# Patient Record
Sex: FEMALE | Race: WHITE | Employment: OTHER | URBAN - METROPOLITAN AREA
[De-identification: names, ages, dates, MRNs, and addresses within clinical notes are randomized per-mention and may not be internally consistent; named-entity substitution may affect disease eponyms.]

---

## 2017-06-01 LAB
COLONOSCOPY, EXTERNAL: 0
COLONOSCOPY, EXTERNAL: NORMAL

## 2018-07-05 LAB
MAMMOGRAPHY, EXTERNAL: 0
PAP SMEAR, EXTERNAL: 0

## 2018-07-16 LAB
AMB DEXA, EXTERNAL: 0
AMB DEXA, EXTERNAL: NORMAL

## 2020-09-01 VITALS — HEIGHT: 61 IN | BODY MASS INDEX: 28.72 KG/M2

## 2020-09-01 PROBLEM — E78.00 HYPERCHOLESTEROLEMIA: Status: ACTIVE | Noted: 2020-09-01

## 2020-09-01 PROBLEM — K21.9 GERD (GASTROESOPHAGEAL REFLUX DISEASE): Status: ACTIVE | Noted: 2020-09-01

## 2020-09-01 PROBLEM — Z78.0 MENOPAUSE: Status: ACTIVE | Noted: 2020-09-01

## 2020-09-01 PROBLEM — M85.80 OSTEOPENIA: Status: ACTIVE | Noted: 2020-09-01

## 2020-09-01 PROBLEM — I51.9 HEART DISEASE: Status: ACTIVE | Noted: 2020-09-01

## 2020-09-01 RX ORDER — SOTALOL HYDROCHLORIDE 120 MG/1
120 TABLET ORAL 2 TIMES DAILY
COMMUNITY

## 2020-09-01 RX ORDER — SULFAMETHOXAZOLE AND TRIMETHOPRIM 800; 160 MG/1; MG/1
1 TABLET ORAL 2 TIMES DAILY
COMMUNITY

## 2020-09-01 RX ORDER — ESTRADIOL AND NORETHINDRONE ACETATE .5; .1 MG/1; MG/1
1 TABLET ORAL DAILY
COMMUNITY
End: 2020-11-04

## 2020-09-01 RX ORDER — LEVOTHYROXINE SODIUM 50 UG/1
TABLET ORAL
COMMUNITY

## 2020-09-01 RX ORDER — AZITHROMYCIN 250 MG/1
250 TABLET, FILM COATED ORAL DAILY
COMMUNITY
End: 2021-08-17 | Stop reason: ALTCHOICE

## 2020-09-01 RX ORDER — COLCHICINE 0.6 MG/1
0.6 TABLET ORAL DAILY
COMMUNITY

## 2020-09-01 RX ORDER — AMIODARONE HYDROCHLORIDE 200 MG/1
TABLET ORAL
COMMUNITY

## 2020-11-04 DIAGNOSIS — N95.9 UNSPECIFIED MENOPAUSAL AND PERIMENOPAUSAL DISORDER: ICD-10-CM

## 2020-11-04 RX ORDER — ESTRADIOL AND NORETHINDRONE ACETATE .5; .1 MG/1; MG/1
TABLET ORAL
Qty: 84 TAB | Refills: 4 | Status: SHIPPED | OUTPATIENT
Start: 2020-11-04 | End: 2022-01-20

## 2021-08-11 VITALS — HEIGHT: 61 IN | BODY MASS INDEX: 28.72 KG/M2

## 2021-08-17 ENCOUNTER — OFFICE VISIT (OUTPATIENT)
Dept: OBGYN CLINIC | Age: 76
End: 2021-08-17
Payer: MEDICARE

## 2021-08-17 VITALS — BODY MASS INDEX: 28.51 KG/M2 | WEIGHT: 151 LBS | HEIGHT: 61 IN

## 2021-08-17 DIAGNOSIS — N95.2 VAGINAL ATROPHY: ICD-10-CM

## 2021-08-17 DIAGNOSIS — N95.0 POSTMENOPAUSAL BLEEDING: Primary | ICD-10-CM

## 2021-08-17 PROCEDURE — 1101F PT FALLS ASSESS-DOCD LE1/YR: CPT | Performed by: OBSTETRICS & GYNECOLOGY

## 2021-08-17 PROCEDURE — 1090F PRES/ABSN URINE INCON ASSESS: CPT | Performed by: OBSTETRICS & GYNECOLOGY

## 2021-08-17 PROCEDURE — 99213 OFFICE O/P EST LOW 20 MIN: CPT | Performed by: OBSTETRICS & GYNECOLOGY

## 2021-08-17 PROCEDURE — G8419 CALC BMI OUT NRM PARAM NOF/U: HCPCS | Performed by: OBSTETRICS & GYNECOLOGY

## 2021-08-17 PROCEDURE — G8399 PT W/DXA RESULTS DOCUMENT: HCPCS | Performed by: OBSTETRICS & GYNECOLOGY

## 2021-08-17 PROCEDURE — 3017F COLORECTAL CA SCREEN DOC REV: CPT | Performed by: OBSTETRICS & GYNECOLOGY

## 2021-08-17 PROCEDURE — G8536 NO DOC ELDER MAL SCRN: HCPCS | Performed by: OBSTETRICS & GYNECOLOGY

## 2021-08-17 PROCEDURE — G8510 SCR DEP NEG, NO PLAN REQD: HCPCS | Performed by: OBSTETRICS & GYNECOLOGY

## 2021-08-17 PROCEDURE — G8427 DOCREV CUR MEDS BY ELIG CLIN: HCPCS | Performed by: OBSTETRICS & GYNECOLOGY

## 2021-08-17 NOTE — PROGRESS NOTES
Sabrina Lemus is a , 76 y.o. female   No LMP recorded. Patient is postmenopausal.    She presents for her problem    She is having noted some pink when wiping last week then some dark old blood, nothing heavy. . None since last week. Menstrual status:  Cycles are menopausal.    Flow: absent. She does not have dysmenorrhea. Medical conditions:  Since her last annual GYN exam about one year ago, she has not the following changes in her health history: none. Mammogram History:    ALBERT Results (most recent):  No results found for this or any previous visit. DEXA Results (most recent):  No results found for this or any previous visit. Past Medical History:   Diagnosis Date    Arrhythmia     afib    Arthritis     GERD (gastroesophageal reflux disease)     Heart disease     Hypercholesterolemia     PMB (postmenopausal bleeding) 2021     Past Surgical History:   Procedure Laterality Date    HX  SECTION      x 3    HX COLONOSCOPY      HX GI  2017    Colonoscopy    HX TUBAL LIGATION Bilateral     LA RECONSTR NOSE         Prior to Admission medications    Medication Sig Start Date End Date Taking? Authorizing Provider   estradiol-norethindrone (ACTIVELLA) 0.5-0.1 mg per tablet TAKE 1 TABLET BY MOUTH EVERY DAY 20  Yes Dustin Tiwari MD   amiodarone (CORDARONE) 200 mg tablet Take  by mouth. Yes Provider, Historical   colchicine 0.6 mg tablet Take 0.6 mg by mouth daily. Yes Provider, Historical   apixaban (Eliquis) 5 mg tablet Take 5 mg by mouth two (2) times a day. Yes Provider, Historical   fluticasone prp-sod. chl,bicarb 50 mcg- 0.9 % ksps by Nasal route. Yes Provider, Historical   levothyroxine (SYNTHROID) 50 mcg tablet Take  by mouth Daily (before breakfast). Yes Provider, Historical   sotaloL (BETAPACE) 120 mg tablet Take 120 mg by mouth two (2) times a day.    Yes Provider, Historical   trimethoprim-sulfamethoxazole (BACTRIM DS, SEPTRA DS) 160-800 mg per tablet Take 1 Tab by mouth two (2) times a day. Yes Provider, Historical   rivaroxaban (Xarelto) 20 mg tab tablet Take  by mouth daily. Yes Provider, Historical   oxyCODONE-acetaminophen (PERCOCET) 5-325 mg per tablet Take 1-2 tablets by mouth every four (4) hours as needed for Pain. 12/31/14  Yes Richard Cloud NP   promethazine (PHENERGAN) 25 mg tablet Take 1 tablet by mouth every six (6) hours as needed for Nausea. 12/31/14  Yes Roman Cloud NP   diazepam (VALIUM) 5 mg tablet Take 1 tablet by mouth every six (6) hours as needed for Anxiety. 12/31/14  Yes Roman Cloud NP   propafenone (RYTHMOL) 225 mg tablet Take 225 mg by mouth two (2) times a day. Yes Provider, Historical   dexlansoprazole (DEXILANT) 30 mg capsule Take 30 mg by mouth daily. Yes Provider, Historical   METOPROLOL SUCCINATE PO Take 25 mg by mouth daily. Yes Provider, Historical   pravastatin (PRAVACHOL) 40 mg tablet Take 40 mg by mouth daily. Yes Provider, Historical   estradiol (ESTRACE) 1 mg tablet Take 1 mg by mouth daily. Yes Provider, Historical   fexofenadine (ALLEGRA) 180 mg tablet Take 180 mg by mouth daily. Yes Provider, Historical   beclomethasone (BECONASE AQ) 42 mcg (0.042 %) nasal spray 2 puffs by Both Nostrils route two (2) times a day. Yes Provider, Historical   garlic cap Take  by mouth daily. Yes Provider, Historical   ascorbic acid (VITAMIN C) 500 mg tablet Take 500 mg by mouth daily. Yes Provider, Historical   cholecalciferol (VITAMIN D3) 1,000 unit tablet Take 1,000 Units by mouth daily. Yes Provider, Historical   Krill-OM3-DHA-EPA-OM6-Lip-Astx 1000-130(40-80) mg cap Take  by mouth daily. Yes Provider, Historical   calcium 500 mg tab Take 1,000 mg by mouth daily. Yes Provider, Historical   zinc 50 mg tab tablet Take 50 mg by mouth daily. Yes Provider, Historical   nicotinic acid (NIACIN) 500 mg tablet Take 500 mg by mouth Daily (before breakfast).    Yes Provider, Historical magnesium 250 mg tab Take  by mouth daily. Yes Provider, Historical   OTHER    Yes Provider, Historical   VIT C/VIT E ACETATE/LUTEIN/MIN (OCUVITE LUTEIN PO) Take  by mouth daily. Yes Provider, Historical   docusate sodium (STOOL SOFTENER) 100 mg capsule Take 100 mg by mouth two (2) times a day. Yes Provider, Historical   OTHER    Yes Provider, Historical       No Known Allergies       Tobacco History:  reports that she has never smoked. She has never used smokeless tobacco.  Alcohol Abuse:  reports no history of alcohol use. Drug Abuse:  reports no history of drug use. Family Medical/Cancer History:   Family History   Problem Relation Age of Onset   Beckie Luu Cancer Mother         mouth cancer    Alcohol abuse Mother     Alcohol abuse Father     Heart Disease Sister    Nellie Shape Sister     Cancer Brother         leukemia    Heart Disease Sister     Arthritis-osteo Sister     Diabetes Other           Review of Systems   Constitutional: Negative for chills, fever, malaise/fatigue and weight loss. HENT: Negative for congestion, ear pain, sinus pain and tinnitus. Eyes: Negative for blurred vision and double vision. Respiratory: Negative for cough, shortness of breath and wheezing. Cardiovascular: Negative for chest pain and palpitations. Gastrointestinal: Negative for abdominal pain, blood in stool, constipation, diarrhea, heartburn, nausea and vomiting. Genitourinary: Negative for dysuria, flank pain, frequency, hematuria and urgency. Musculoskeletal: Negative for joint pain and myalgias. Skin: Negative for itching and rash. Neurological: Negative for dizziness, weakness and headaches. Psychiatric/Behavioral: Negative for depression, memory loss and suicidal ideas. The patient is not nervous/anxious and does not have insomnia. Physical Exam  Constitutional:       Appearance: Normal appearance. HENT:      Head: Normocephalic and atraumatic.    Abdominal:      General: Abdomen is flat. Palpations: Abdomen is soft. Genitourinary:     General: Normal vulva. Vagina: Normal.      Cervix: Normal.      Uterus: Normal.       Adnexa: Right adnexa normal and left adnexa normal.      Rectum: Normal.            Comments: No PAP obtained    Vaginal atrophy  Neurological:      Mental Status: She is alert. Psychiatric:         Mood and Affect: Mood normal.         Behavior: Behavior normal.         Thought Content: Thought content normal.          Visit Vitals  Ht 5' 1\" (1.549 m)   Wt 151 lb (68.5 kg)   BMI 28.53 kg/m²         Assessment: Diagnoses and all orders for this visit:    1. Postmenopausal bleeding    2.  Vaginal atrophy    DWP- PMB most likely do to atrophy( and being on blood thinner)- mariela since cervix closed and the bleeding was not heavy- will wait and see, if persists or changes- instructed to call and will get US and may need D and C if Endobx can't be done- pt agrees and understands    Plan: Questions addressed  Counseled re: diet, exercise, healthy lifestyle  Return for Annual  Rec annual mammogram  Rec: DEXA  Rec: US

## 2021-08-17 NOTE — PROGRESS NOTES
Chief Complaint   Patient presents with    Post Menopausal Bleeding     Visit Vitals  Ht 5' 1\" (1.549 m)   Wt 151 lb (68.5 kg)   BMI 28.53 kg/m²

## 2022-01-20 DIAGNOSIS — N95.9 UNSPECIFIED MENOPAUSAL AND PERIMENOPAUSAL DISORDER: ICD-10-CM

## 2022-01-20 RX ORDER — ESTRADIOL AND NORETHINDRONE ACETATE .5; .1 MG/1; MG/1
TABLET ORAL
Qty: 84 TABLET | Refills: 4 | Status: SHIPPED | OUTPATIENT
Start: 2022-01-20 | End: 2022-06-03 | Stop reason: SDUPTHER

## 2022-03-19 PROBLEM — E78.00 HYPERCHOLESTEROLEMIA: Status: ACTIVE | Noted: 2020-09-01

## 2022-03-19 PROBLEM — M85.80 OSTEOPENIA: Status: ACTIVE | Noted: 2020-09-01

## 2022-03-19 PROBLEM — K21.9 GERD (GASTROESOPHAGEAL REFLUX DISEASE): Status: ACTIVE | Noted: 2020-09-01

## 2022-03-19 PROBLEM — I51.9 HEART DISEASE: Status: ACTIVE | Noted: 2020-09-01

## 2022-03-19 PROBLEM — Z78.0 MENOPAUSE: Status: ACTIVE | Noted: 2020-09-01

## 2022-06-03 ENCOUNTER — OFFICE VISIT (OUTPATIENT)
Dept: OBGYN CLINIC | Age: 77
End: 2022-06-03
Payer: MEDICARE

## 2022-06-03 VITALS — HEIGHT: 61 IN | BODY MASS INDEX: 28.53 KG/M2

## 2022-06-03 DIAGNOSIS — N95.1 MENOPAUSAL SYNDROME: ICD-10-CM

## 2022-06-03 DIAGNOSIS — N95.9 UNSPECIFIED MENOPAUSAL AND PERIMENOPAUSAL DISORDER: ICD-10-CM

## 2022-06-03 DIAGNOSIS — Z12.4 SCREENING FOR MALIGNANT NEOPLASM OF THE CERVIX: Primary | ICD-10-CM

## 2022-06-03 PROCEDURE — G0101 CA SCREEN;PELVIC/BREAST EXAM: HCPCS | Performed by: OBSTETRICS & GYNECOLOGY

## 2022-06-03 PROCEDURE — G8427 DOCREV CUR MEDS BY ELIG CLIN: HCPCS | Performed by: OBSTETRICS & GYNECOLOGY

## 2022-06-03 PROCEDURE — G8536 NO DOC ELDER MAL SCRN: HCPCS | Performed by: OBSTETRICS & GYNECOLOGY

## 2022-06-03 PROCEDURE — G8417 CALC BMI ABV UP PARAM F/U: HCPCS | Performed by: OBSTETRICS & GYNECOLOGY

## 2022-06-03 PROCEDURE — G8510 SCR DEP NEG, NO PLAN REQD: HCPCS | Performed by: OBSTETRICS & GYNECOLOGY

## 2022-06-03 RX ORDER — ESTRADIOL AND NORETHINDRONE ACETATE .5; .1 MG/1; MG/1
1 TABLET ORAL DAILY
Qty: 84 TABLET | Refills: 3 | Status: SHIPPED | OUTPATIENT
Start: 2022-06-03

## 2022-06-03 NOTE — PROGRESS NOTES
Velasquez Escobedo is a , 68 y.o. female   No LMP recorded. Patient is postmenopausal.    She presents for her annual    She is having no significant problems. Does note some left breast soreness off and on- denies any masses, discharge or trauma      Menstrual status:  Cycles are menopausal.    Flow: absent. She does not have dysmenorrhea. Medical conditions:  Since her last annual GYN exam about one year ago, she has not the following changes in her health history: none. Mammogram History:    ALBERT Results (most recent):  No results found for this or any previous visit. DEXA Results (most recent):  No results found for this or any previous visit. Past Medical History:   Diagnosis Date    Arrhythmia     afib    Arthritis     GERD (gastroesophageal reflux disease)     Heart disease     Hypercholesterolemia     PMB (postmenopausal bleeding) 2021     Past Surgical History:   Procedure Laterality Date    HX  SECTION      x 3    HX COLONOSCOPY      HX GI  2017    Colonoscopy    HX TUBAL LIGATION Bilateral     NM RECONSTR NOSE         Prior to Admission medications    Medication Sig Start Date End Date Taking? Authorizing Provider   estradiol-norethindrone (ACTIVELLA) 0.5-0.1 mg per tablet Take 1 Tablet by mouth daily. 6/3/22  Yes Wilfredo Ag MD   amiodarone (CORDARONE) 200 mg tablet Take  by mouth. Yes Provider, Historical   colchicine 0.6 mg tablet Take 0.6 mg by mouth daily. Yes Provider, Historical   apixaban (Eliquis) 5 mg tablet Take 5 mg by mouth two (2) times a day. Yes Provider, Historical   fluticasone prp-sod. chl,bicarb 50 mcg- 0.9 % ksps by Nasal route. Yes Provider, Historical   levothyroxine (SYNTHROID) 50 mcg tablet Take  by mouth Daily (before breakfast). Yes Provider, Historical   sotaloL (BETAPACE) 120 mg tablet Take 120 mg by mouth two (2) times a day.    Yes Provider, Historical   trimethoprim-sulfamethoxazole (BACTRIM DS, SEPTRA DS) 160-800 mg per tablet Take 1 Tab by mouth two (2) times a day. Yes Provider, Historical   rivaroxaban (Xarelto) 20 mg tab tablet Take  by mouth daily. Yes Provider, Historical   oxyCODONE-acetaminophen (PERCOCET) 5-325 mg per tablet Take 1-2 tablets by mouth every four (4) hours as needed for Pain. 12/31/14  Yes Jin Cloud, CHUCK   promethazine (PHENERGAN) 25 mg tablet Take 1 tablet by mouth every six (6) hours as needed for Nausea. 12/31/14  Yes Francisco Cloud NP   diazepam (VALIUM) 5 mg tablet Take 1 tablet by mouth every six (6) hours as needed for Anxiety. 12/31/14  Yes Francisco Cloud NP   propafenone (RYTHMOL) 225 mg tablet Take 225 mg by mouth two (2) times a day. Yes Provider, Historical   dexlansoprazole (DEXILANT) 30 mg capsule Take 30 mg by mouth daily. Yes Provider, Historical   METOPROLOL SUCCINATE PO Take 25 mg by mouth daily. Yes Provider, Historical   pravastatin (PRAVACHOL) 40 mg tablet Take 40 mg by mouth daily. Yes Provider, Historical   fexofenadine (ALLEGRA) 180 mg tablet Take 180 mg by mouth daily. Yes Provider, Historical   beclomethasone (BECONASE AQ) 42 mcg (0.042 %) nasal spray 2 puffs by Both Nostrils route two (2) times a day. Yes Provider, Historical   garlic cap Take  by mouth daily. Yes Provider, Historical   ascorbic acid (VITAMIN C) 500 mg tablet Take 500 mg by mouth daily. Yes Provider, Historical   cholecalciferol (VITAMIN D3) 1,000 unit tablet Take 1,000 Units by mouth daily. Yes Provider, Historical   Krill-OM3-DHA-EPA-OM6-Lip-Astx 1000-130(40-80) mg cap Take  by mouth daily. Yes Provider, Historical   calcium 500 mg tab Take 1,000 mg by mouth daily. Yes Provider, Historical   zinc 50 mg tab tablet Take 50 mg by mouth daily. Yes Provider, Historical   nicotinic acid (NIACIN) 500 mg tablet Take 500 mg by mouth Daily (before breakfast). Yes Provider, Historical   magnesium 250 mg tab Take  by mouth daily.    Yes Provider, Historical   OTHER Yes Provider, Historical   VIT C/VIT E ACETATE/LUTEIN/MIN (OCUVITE LUTEIN PO) Take  by mouth daily. Yes Provider, Historical   docusate sodium (STOOL SOFTENER) 100 mg capsule Take 100 mg by mouth two (2) times a day. Yes Provider, Historical   OTHER    Yes Provider, Historical       No Known Allergies       Tobacco History:  reports that she has never smoked. She has never used smokeless tobacco.  Alcohol use:  reports no history of alcohol use. Drug use:  reports no history of drug use. Family Medical/Cancer History:   Family History   Problem Relation Age of Onset   Ornelas Cancer Mother         mouth cancer    Alcohol abuse Mother     Alcohol abuse Father     Heart Disease Sister    Levonia Simmer Sister     Cancer Brother         leukemia    Heart Disease Sister     OSTEOARTHRITIS Sister     Diabetes Other           Review of Systems   Constitutional: Negative for chills, fever, malaise/fatigue and weight loss. HENT: Negative for congestion, ear pain, sinus pain and tinnitus. Eyes: Negative for blurred vision and double vision. Respiratory: Negative for cough, shortness of breath and wheezing. Cardiovascular: Negative for chest pain and palpitations. Gastrointestinal: Negative for abdominal pain, blood in stool, constipation, diarrhea, heartburn, nausea and vomiting. Genitourinary: Negative for dysuria, flank pain, frequency, hematuria and urgency. Musculoskeletal: Negative for joint pain and myalgias. Skin: Negative for itching and rash. Neurological: Negative for dizziness, weakness and headaches. Psychiatric/Behavioral: Negative for depression, memory loss and suicidal ideas. The patient is not nervous/anxious and does not have insomnia. Physical Exam  Constitutional:       Appearance: Normal appearance. HENT:      Head: Normocephalic and atraumatic. Cardiovascular:      Rate and Rhythm: Normal rate. Heart sounds: Normal heart sounds.    Pulmonary: Effort: Pulmonary effort is normal.      Breath sounds: Normal breath sounds. Chest:   Breasts:      Right: Normal.      Left: Normal.       Abdominal:      General: Abdomen is flat. Palpations: Abdomen is soft. Genitourinary:     General: Normal vulva. Vagina: Normal.      Cervix: Normal.      Uterus: Normal.       Adnexa: Right adnexa normal and left adnexa normal.      Rectum: Normal.      Comments: PAP Obtained  Neurological:      Mental Status: She is alert. Psychiatric:         Mood and Affect: Mood normal.         Behavior: Behavior normal.         Thought Content: Thought content normal.          Visit Vitals  Ht 5' 1\" (1.549 m)   BMI 28.53 kg/m²         Assessment:   Diagnoses and all orders for this visit:    1. Screening for malignant neoplasm of the cervix  -     PAP IG, RFX APTIMA HPV ASCUS ([id number])    2. Menopausal syndrome    3. Unspecified menopausal and perimenopausal disorder  -     estradiol-norethindrone (ACTIVELLA) 0.5-0.1 mg per tablet; Take 1 Tablet by mouth daily. Plan: Questions addressed  Counseled re: diet, exercise, healthy lifestyle  Return for Annual  Rec annual mammogram        Follow-up and Dispositions    · Return for Mammogram, 1 yr annual, 1 yr mammo.

## 2022-06-03 NOTE — PROGRESS NOTES
Chief Complaint   Patient presents with    Annual Exam     Visit Vitals  Ht 5' 1\" (1.549 m)   BMI 28.53 kg/m²

## 2022-06-09 LAB
CYTOLOGIST CVX/VAG CYTO: NORMAL
CYTOLOGY CVX/VAG DOC CYTO: NORMAL
CYTOLOGY CVX/VAG DOC THIN PREP: NORMAL
DX ICD CODE: NORMAL
LABCORP, 190119: NORMAL
Lab: NORMAL
Lab: NORMAL
OTHER STN SPEC: NORMAL
STAT OF ADQ CVX/VAG CYTO-IMP: NORMAL

## 2022-07-27 ENCOUNTER — TELEPHONE (OUTPATIENT)
Dept: OBGYN CLINIC | Age: 77
End: 2022-07-27

## 2022-07-27 DIAGNOSIS — N95.9 POSTMENOPAUSAL SYMPTOMS: Primary | ICD-10-CM

## 2022-07-27 NOTE — TELEPHONE ENCOUNTER
Patient is in the office for her mammogram and is asking when she had her last bone density test.  Advised her it was 07/16/18. She is requesting an order to have another one done. Appt scheduled for the patient at [address] [place] [address] on 08/04/22 at 1:40 with a 1:10 arrival time. Order faxed.

## 2022-08-04 DIAGNOSIS — N95.9 POSTMENOPAUSAL SYMPTOMS: ICD-10-CM

## 2022-08-09 NOTE — PROGRESS NOTES
DEXA: Spine: normal values, Hips: T-score: -2.3  Stressed, Ca/Vit. D/Weight bearing exercises, cont.  HRT  Rescan in 2yrs  DWP by phone

## 2023-05-19 RX ORDER — PRAVASTATIN SODIUM 40 MG
40 TABLET ORAL DAILY
COMMUNITY

## 2023-05-19 RX ORDER — ESTRADIOL AND NORETHINDRONE ACETATE .5; .1 MG/1; MG/1
1 TABLET ORAL DAILY
COMMUNITY
Start: 2022-06-03 | End: 2023-06-30

## 2023-05-19 RX ORDER — AMIODARONE HYDROCHLORIDE 200 MG/1
TABLET ORAL
COMMUNITY

## 2023-05-19 RX ORDER — ASCORBIC ACID 500 MG
500 TABLET ORAL DAILY
COMMUNITY

## 2023-05-19 RX ORDER — PROMETHAZINE HYDROCHLORIDE 25 MG/1
25 TABLET ORAL EVERY 6 HOURS PRN
COMMUNITY
Start: 2014-12-31

## 2023-05-19 RX ORDER — SULFAMETHOXAZOLE AND TRIMETHOPRIM 800; 160 MG/1; MG/1
1 TABLET ORAL 2 TIMES DAILY
COMMUNITY

## 2023-05-19 RX ORDER — DEXLANSOPRAZOLE 30 MG/1
30 CAPSULE, DELAYED RELEASE ORAL DAILY
COMMUNITY

## 2023-05-19 RX ORDER — PSEUDOEPHEDRINE HCL 30 MG
100 TABLET ORAL 2 TIMES DAILY
COMMUNITY

## 2023-05-19 RX ORDER — PROPAFENONE HYDROCHLORIDE 225 MG/1
225 TABLET, FILM COATED ORAL 2 TIMES DAILY
COMMUNITY

## 2023-05-19 RX ORDER — OXYCODONE HYDROCHLORIDE AND ACETAMINOPHEN 5; 325 MG/1; MG/1
1-2 TABLET ORAL EVERY 4 HOURS PRN
COMMUNITY
Start: 2014-12-31

## 2023-05-19 RX ORDER — FEXOFENADINE HCL 180 MG/1
180 TABLET ORAL DAILY
COMMUNITY

## 2023-05-19 RX ORDER — COLCHICINE 0.6 MG/1
0.6 TABLET ORAL DAILY
COMMUNITY

## 2023-05-19 RX ORDER — LEVOTHYROXINE SODIUM 0.05 MG/1
TABLET ORAL
COMMUNITY

## 2023-05-19 RX ORDER — DIAZEPAM 5 MG/1
5 TABLET ORAL EVERY 6 HOURS PRN
COMMUNITY
Start: 2014-12-31

## 2023-05-19 RX ORDER — SOTALOL HYDROCHLORIDE 120 MG/1
120 TABLET ORAL 2 TIMES DAILY
COMMUNITY

## 2023-05-19 RX ORDER — NIACIN 500 MG
500 TABLET ORAL
COMMUNITY

## 2023-06-30 DIAGNOSIS — N95.9 UNSPECIFIED MENOPAUSAL AND PERIMENOPAUSAL DISORDER: ICD-10-CM

## 2023-06-30 RX ORDER — ESTRADIOL AND NORETHINDRONE ACETATE .5; .1 MG/1; MG/1
TABLET ORAL
Qty: 84 TABLET | Refills: 0 | Status: SHIPPED | OUTPATIENT
Start: 2023-06-30

## 2023-09-24 DIAGNOSIS — N95.9 UNSPECIFIED MENOPAUSAL AND PERIMENOPAUSAL DISORDER: ICD-10-CM

## 2023-09-25 RX ORDER — ESTRADIOL AND NORETHINDRONE ACETATE .5; .1 MG/1; MG/1
TABLET ORAL
Qty: 84 TABLET | Refills: 0 | Status: SHIPPED | OUTPATIENT
Start: 2023-09-25

## 2024-03-05 DIAGNOSIS — N95.9 UNSPECIFIED MENOPAUSAL AND PERIMENOPAUSAL DISORDER: ICD-10-CM

## 2024-03-06 RX ORDER — ESTRADIOL AND NORETHINDRONE ACETATE .5; .1 MG/1; MG/1
TABLET ORAL
Qty: 84 TABLET | Refills: 0 | OUTPATIENT
Start: 2024-03-06

## 2024-03-18 DIAGNOSIS — N95.9 UNSPECIFIED MENOPAUSAL AND PERIMENOPAUSAL DISORDER: ICD-10-CM

## 2024-03-18 RX ORDER — ESTRADIOL AND NORETHINDRONE ACETATE .5; .1 MG/1; MG/1
TABLET ORAL
Qty: 84 TABLET | Refills: 0 | OUTPATIENT
Start: 2024-03-18

## 2024-03-19 DIAGNOSIS — N95.9 UNSPECIFIED MENOPAUSAL AND PERIMENOPAUSAL DISORDER: ICD-10-CM

## 2024-03-19 RX ORDER — ESTRADIOL AND NORETHINDRONE ACETATE .5; .1 MG/1; MG/1
TABLET ORAL
Qty: 84 TABLET | Refills: 0 | OUTPATIENT
Start: 2024-03-19

## 2024-04-18 ENCOUNTER — TELEPHONE (OUTPATIENT)
Age: 79
End: 2024-04-18

## 2024-04-18 DIAGNOSIS — N95.9 UNSPECIFIED MENOPAUSAL AND PERIMENOPAUSAL DISORDER: ICD-10-CM

## 2024-04-18 RX ORDER — ESTRADIOL AND NORETHINDRONE ACETATE .5; .1 MG/1; MG/1
1 TABLET ORAL DAILY
Qty: 84 TABLET | Refills: 0 | Status: SHIPPED | OUTPATIENT
Start: 2024-04-18

## 2024-04-18 NOTE — TELEPHONE ENCOUNTER
Pt calling to schedule her annual exam (06/05/24) and requesting a refill on estradiol; pt confirmed her Pharmacy that's on file.ccm

## 2024-06-05 ENCOUNTER — OFFICE VISIT (OUTPATIENT)
Age: 79
End: 2024-06-05
Payer: MEDICARE

## 2024-06-05 VITALS
DIASTOLIC BLOOD PRESSURE: 76 MMHG | SYSTOLIC BLOOD PRESSURE: 138 MMHG | BODY MASS INDEX: 26.83 KG/M2 | HEIGHT: 61 IN | WEIGHT: 142.13 LBS

## 2024-06-05 DIAGNOSIS — N95.1 MENOPAUSAL SYNDROME: ICD-10-CM

## 2024-06-05 DIAGNOSIS — Z01.419 GYNECOLOGIC EXAM NORMAL: ICD-10-CM

## 2024-06-05 DIAGNOSIS — Z12.4 PAP SMEAR FOR CERVICAL CANCER SCREENING: ICD-10-CM

## 2024-06-05 DIAGNOSIS — Z12.31 SCREENING MAMMOGRAM FOR BREAST CANCER: Primary | ICD-10-CM

## 2024-06-05 DIAGNOSIS — N95.9 UNSPECIFIED MENOPAUSAL AND PERIMENOPAUSAL DISORDER: ICD-10-CM

## 2024-06-05 PROCEDURE — G0101 CA SCREEN;PELVIC/BREAST EXAM: HCPCS | Performed by: OBSTETRICS & GYNECOLOGY

## 2024-06-05 PROCEDURE — G8419 CALC BMI OUT NRM PARAM NOF/U: HCPCS | Performed by: OBSTETRICS & GYNECOLOGY

## 2024-06-05 PROCEDURE — G8427 DOCREV CUR MEDS BY ELIG CLIN: HCPCS | Performed by: OBSTETRICS & GYNECOLOGY

## 2024-06-05 ASSESSMENT — ENCOUNTER SYMPTOMS
RESPIRATORY NEGATIVE: 1
GASTROINTESTINAL NEGATIVE: 1

## 2024-06-05 NOTE — PROGRESS NOTES
Chief Complaint   Patient presents with    Annual Exam     Wdlyy-4-02-22     /76 (Site: Left Upper Arm, Position: Sitting, Cuff Size: Small Adult)   Ht 1.549 m (5' 1\")   Wt 64.5 kg (142 lb 2 oz)   BMI 26.85 kg/m²     
Sister     Heart Disease Sister     Cancer Mother         mouth cancer    Diabetes Other     Alcohol Abuse Father         Review of Systems   Constitutional: Negative.    Respiratory: Negative.     Cardiovascular: Negative.    Gastrointestinal: Negative.    Genitourinary: Negative.    Musculoskeletal: Negative.    Neurological: Negative.    Psychiatric/Behavioral: Negative.           /76 (Site: Left Upper Arm, Position: Sitting, Cuff Size: Small Adult)   Ht 1.549 m (5' 1\")   Wt 64.5 kg (142 lb 2 oz)   BMI 26.85 kg/m²     Physical Exam  Constitutional:       Appearance: Normal appearance.   Cardiovascular:      Rate and Rhythm: Normal rate and regular rhythm.   Pulmonary:      Effort: Pulmonary effort is normal.      Breath sounds: Normal breath sounds.   Chest:   Breasts:     Right: Normal.      Left: Normal.   Abdominal:      General: Abdomen is flat.      Palpations: Abdomen is soft.   Genitourinary:     General: Normal vulva.      Vagina: Normal.      Cervix: Normal.      Uterus: Normal.       Adnexa: Right adnexa normal and left adnexa normal.      Rectum: Normal.      Comments: Pap obtained  Urethra normal  Atrophy present  Musculoskeletal:         General: Normal range of motion.   Neurological:      General: No focal deficit present.      Mental Status: She is alert and oriented to person, place, and time. Mental status is at baseline.   Psychiatric:         Mood and Affect: Mood normal.         Behavior: Behavior normal.         Thought Content: Thought content normal.         Judgment: Judgment normal.              Assessment: Anu was seen today for annual exam.    Diagnoses and all orders for this visit:    Screening mammogram for breast cancer  -     [facility] KASIA DIGITAL SCREEN BILATERAL; Future    Pap smear for cervical cancer screening  -     PAP LB, Reflex HPV ASCUS ([id number]) ([facility])    Unspecified menopausal and perimenopausal disorder    Menopausal syndrome    Gynecologic exam

## 2024-06-11 LAB
., LABCORP: NORMAL
CYTOLOGIST CVX/VAG CYTO: NORMAL
CYTOLOGY CVX/VAG DOC CYTO: NORMAL
CYTOLOGY CVX/VAG DOC THIN PREP: NORMAL
DX ICD CODE: NORMAL
Lab: NORMAL
Lab: NORMAL
OTHER STN SPEC: NORMAL
STAT OF ADQ CVX/VAG CYTO-IMP: NORMAL

## 2024-07-09 DIAGNOSIS — N95.9 UNSPECIFIED MENOPAUSAL AND PERIMENOPAUSAL DISORDER: ICD-10-CM

## 2024-07-09 RX ORDER — ESTRADIOL AND NORETHINDRONE ACETATE .5; .1 MG/1; MG/1
1 TABLET ORAL DAILY
Qty: 84 TABLET | Refills: 3 | Status: SHIPPED | OUTPATIENT
Start: 2024-07-09

## 2024-08-30 ENCOUNTER — OFFICE VISIT (OUTPATIENT)
Age: 79
End: 2024-08-30
Payer: MEDICARE

## 2024-08-30 VITALS
DIASTOLIC BLOOD PRESSURE: 74 MMHG | WEIGHT: 142.44 LBS | BODY MASS INDEX: 26.89 KG/M2 | HEIGHT: 61 IN | SYSTOLIC BLOOD PRESSURE: 124 MMHG

## 2024-08-30 DIAGNOSIS — N88.2 STENOSIS OF CERVIX: ICD-10-CM

## 2024-08-30 DIAGNOSIS — N95.0 POST-MENOPAUSAL BLEEDING: Primary | ICD-10-CM

## 2024-08-30 PROCEDURE — 1090F PRES/ABSN URINE INCON ASSESS: CPT | Performed by: OBSTETRICS & GYNECOLOGY

## 2024-08-30 PROCEDURE — 99213 OFFICE O/P EST LOW 20 MIN: CPT | Performed by: OBSTETRICS & GYNECOLOGY

## 2024-08-30 PROCEDURE — G8419 CALC BMI OUT NRM PARAM NOF/U: HCPCS | Performed by: OBSTETRICS & GYNECOLOGY

## 2024-08-30 PROCEDURE — G8427 DOCREV CUR MEDS BY ELIG CLIN: HCPCS | Performed by: OBSTETRICS & GYNECOLOGY

## 2024-08-30 PROCEDURE — 1036F TOBACCO NON-USER: CPT | Performed by: OBSTETRICS & GYNECOLOGY

## 2024-08-30 PROCEDURE — G8399 PT W/DXA RESULTS DOCUMENT: HCPCS | Performed by: OBSTETRICS & GYNECOLOGY

## 2024-08-30 PROCEDURE — 1123F ACP DISCUSS/DSCN MKR DOCD: CPT | Performed by: OBSTETRICS & GYNECOLOGY

## 2024-08-30 ASSESSMENT — ENCOUNTER SYMPTOMS
RESPIRATORY NEGATIVE: 1
GASTROINTESTINAL NEGATIVE: 1

## 2024-08-30 NOTE — PROGRESS NOTES
Anu Negrete is a No obstetric history on file., 78 y.o. female   No LMP recorded. (Menstrual status: Menopause).    She presents for her problem    She is having  some dark vaginal d/c, had recent watchman placement .      Menstrual status:  Cycles are menopausal.    Flow: spotting.      She does not have dysmenorrhea.      Medical conditions:  Since her last annual GYN exam about one year ago, she has not the following changes in her health history: none.     Mammogram History:    LUL Results (most recent):  @Endpoint Clinical(PDY2511:1)@     DEXA Results (most recent):  @Endpoint Clinical(WCC5207:1)@       Past Medical History:   Diagnosis Date    Arrhythmia     afib    Arthritis     GERD (gastroesophageal reflux disease)     Heart disease     Hypercholesterolemia     PMB (postmenopausal bleeding) 2021     Past Surgical History:   Procedure Laterality Date     SECTION      x 3    COLONOSCOPY      GI  2017    Colonoscopy    RECONSTR NOSE      TUBAL LIGATION Bilateral        Prior to Admission medications    Medication Sig Start Date End Date Taking? Authorizing Provider   Estradiol-Norethindrone Acet 0.5-0.1 MG TABS TAKE 1 TABLET BY MOUTH EVERY DAY 24  Yes Juancho Cheng MD   METOPROLOL SUCCINATE PO Take 25 mg by mouth daily   Yes Automatic Reconciliation, Ar   amiodarone (CORDARONE) 200 MG tablet Take by mouth   Yes Automatic Reconciliation, Ar   apixaban (ELIQUIS) 5 MG TABS tablet Take 1 tablet by mouth 2 times daily   Yes Automatic Reconciliation, Ar   ascorbic acid (VITAMIN C) 500 MG tablet Take 1 tablet by mouth daily   Yes Automatic Reconciliation, Ar   beclomethasone (BECONASE-AQ) 42 MCG/SPRAY nasal spray 2 sprays by Nasal route 2 times daily   Yes Automatic Reconciliation, Ar   vitamin D (CHOLECALCIFEROL) 25 MCG (1000 UT) TABS tablet Take 1 tablet by mouth daily   Yes Automatic Reconciliation, Ar   colchicine (COLCRYS) 0.6 MG tablet Take 1 tablet by mouth daily   Yes Automatic  Reconciliation, Ar   dexlansoprazole (DEXILANT) 30 MG CPDR delayed release capsule Take 30 mg by mouth daily   Yes Automatic Reconciliation, Ar   diazePAM (VALIUM) 5 MG tablet Take 1 tablet by mouth every 6 hours as needed. 12/31/14  Yes Automatic Reconciliation, Ar   docusate (COLACE, DULCOLAX) 100 MG CAPS Take 100 mg by mouth 2 times daily   Yes Automatic Reconciliation, Ar   fexofenadine (ALLEGRA) 180 MG tablet Take 1 tablet by mouth daily   Yes Automatic Reconciliation, Ar   levothyroxine (SYNTHROID) 50 MCG tablet Take by mouth every morning (before breakfast)   Yes Automatic Reconciliation, Ar   niacin 500 MG tablet Take 1 tablet by mouth every morning (before breakfast)   Yes Automatic Reconciliation, Ar   oxyCODONE-acetaminophen (PERCOCET) 5-325 MG per tablet Take 1-2 tablets by mouth every 4 hours as needed. 12/31/14  Yes Automatic Reconciliation, Ar   pravastatin (PRAVACHOL) 40 MG tablet Take 1 tablet by mouth daily   Yes Automatic Reconciliation, Ar   promethazine (PHENERGAN) 25 MG tablet Take 1 tablet by mouth every 6 hours as needed 12/31/14  Yes Automatic Reconciliation, Ar   propafenone (RYTHMOL) 225 MG tablet Take 1 tablet by mouth 2 times daily   Yes Automatic Reconciliation, Ar   rivaroxaban (XARELTO) 20 MG TABS tablet Take by mouth daily   Yes Automatic Reconciliation, Ar   sotalol (BETAPACE) 120 MG tablet Take 1 tablet by mouth 2 times daily   Yes Automatic Reconciliation, Ar   sulfamethoxazole-trimethoprim (BACTRIM DS;SEPTRA DS) 800-160 MG per tablet Take 1 tablet by mouth 2 times daily   Yes Automatic Reconciliation, Ar       No Known Allergies       Tobacco History:  reports that she has never smoked. She has never used smokeless tobacco.  Alcohol use:  reports no history of alcohol use.  Drug use:  reports no history of drug use.    Family Medical/Cancer History:   Family History   Problem Relation Age of Onset    Alcohol Abuse Mother     Osteoarthritis Sister     Heart Disease Sister

## 2024-08-30 NOTE — PROGRESS NOTES
Chief Complaint   Patient presents with    Other     Wednesday of this week began having a thick, dark discharge. Denies any discomfort. Unsure if it is vaginal or rectal. States her stool has been having an orange tint     /74 (Site: Left Upper Arm, Position: Sitting, Cuff Size: Small Adult)   Ht 1.549 m (5' 1\")   Wt 64.6 kg (142 lb 7 oz)   BMI 26.91 kg/m²

## 2024-09-09 ENCOUNTER — TELEPHONE (OUTPATIENT)
Age: 79
End: 2024-09-09

## 2024-10-03 DIAGNOSIS — Z12.31 SCREENING MAMMOGRAM FOR BREAST CANCER: ICD-10-CM
